# Patient Record
(demographics unavailable — no encounter records)

---

## 2024-11-26 NOTE — HISTORY OF PRESENT ILLNESS
[Sneakers] : rosie [FreeTextEntry1] : Mr. CAROLANN ATKINSON is a 63-year-old male who presents to office for follow up (last seen (9/12/24) due to left feel pain around 3 months ago after stepping on electric plug on the floor. Patient states pain has improved since last visit; however, he is still feeling sharp pain in the heel area. Patient states he numbness/tingling on left foot after walking his dog and at night. Patient denies any new injuries.  Patient states he bought new sneakers and the gel heel cup which has improved the symptoms. He states he is compliant with calf stretching exercises and HEP.

## 2024-11-26 NOTE — DISCUSSION/SUMMARY
[de-identified] : Options reviewed and patient requesting steroid injection L HPS (risks, benefits reviewed in advance).  Under sterile conditions injection 1 cc Depo Medrol / Lidocaine 1% to plantar fascia origin L hindfoot, tolerated well by patient and Band-Aid applied.  NB shoe wear, gel heel cup, activity modification, calf stretching exercises and HEP.  Return to office in 2 - 3 months / PRN, all questions answered.

## 2024-11-26 NOTE — PHYSICAL EXAM
[Normal] : Oriented to person, place, and time, insight and judgement were intact and the affect was normal [de-identified] : Radiographs (3v L foot) reveal plantar and posterior calcaneal enthesophyte L hindfoot, accessory navicular L midfoot. [de-identified] : Extremity: +equinus (releases) L LE, mild residual tenderness PF origin L hindfoot.  Nontender L ankle, peroneals, syndesmosis, Achilles, hindfoot ST, midfoot LF and PTT insertional, and forefoot.  Calves soft and nontender, sensorimotor unchanged, skin intact L LE.  AOx3, mood / affect normal. [de-identified] : KARON, NAD

## 2024-11-26 NOTE — DISCUSSION/SUMMARY
[de-identified] : Options reviewed and patient requesting steroid injection L HPS (risks, benefits reviewed in advance).  Under sterile conditions injection 1 cc Depo Medrol / Lidocaine 1% to plantar fascia origin L hindfoot, tolerated well by patient and Band-Aid applied.  NB shoe wear, gel heel cup, activity modification, calf stretching exercises and HEP.  Return to office in 2 - 3 months / PRN, all questions answered.

## 2024-11-26 NOTE — PHYSICAL EXAM
[Normal] : Oriented to person, place, and time, insight and judgement were intact and the affect was normal [de-identified] : Radiographs (3v L foot) reveal plantar and posterior calcaneal enthesophyte L hindfoot, accessory navicular L midfoot. [de-identified] : Extremity: +equinus (releases) L LE, mild residual tenderness PF origin L hindfoot.  Nontender L ankle, peroneals, syndesmosis, Achilles, hindfoot ST, midfoot LF and PTT insertional, and forefoot.  Calves soft and nontender, sensorimotor unchanged, skin intact L LE.  AOx3, mood / affect normal. [de-identified] : KARON, NAD

## 2025-03-28 NOTE — HISTORY OF PRESENT ILLNESS
[de-identified] : This is a very nice  64 year old  male experiencing  pain in the left hip and left knee.  The pain is located posterior, in the left buttocks and radiates down the back of the left leg to the knee. He gets cramping in the left legs to the feet. Jolts of electric pain to the LLE to the foot.  He denies groin pain.  He denies any accident or injury..

## 2025-03-28 NOTE — PHYSICAL EXAM
[de-identified] : Patient is well nourished, well-developed, in no acute distress, with appropriate mood and affect. The patient is oriented to time, place, and person. Respirations are even and unlabored. Gait evaluation does not reveal a limp. There is no inguinal adenopathy. The bilateral limbra are well-perfused and showed 2+ dp/pt pulses, without skin lesions, shows a grossly normal motor and sensory examination. Examination of the hip shows no skin lesions. Bilateral hip motion is full and painless throughout ROM. Leg lengths are approximately equal. FADIR is negative and LEONA is negative. Stinchfield test is negative. Both hips are stable and muscle strength is normal with good strength with resisted abduction and adduction. Pedal pulses are palpable.  The left knee moves from 0-135 degrees. The knee is stable within that range-of-motion to AP and ML stress. The alignment of the knee is 5 deg varus. Muscle strength is normal. Pedal pulses are palpable. Hip examination was negative.  [de-identified] : AP and lateral x-rays of both hips, pelvis, and femur were ordered and taken in the office and does not show any arthritic changes to the joint. There is satisfactory joint space.   Long standing knee, AP knee, lateral knee, and patellar views of the left knee were ordered and taken in the office and demonstrate no evidence of degenerative joint disease of the knee with joint space maintained.

## 2025-03-28 NOTE — DISCUSSION/SUMMARY
[de-identified] : The patient has left lower extremity radiculopathy. The pain is extraarticular from the hip and knee. An extensive discussion was conducted on the natural history of the disease and the variety of surgical and non-surgical options available to the patient including, but not limited to non-steroidal anti-inflammatory medications, steroid injections, physical therapy, maintenance of ideal body weight, and reduction of activity. Mobic recommended but he prefers OTC NSAIDs prn pain. PT rx. PMR rx. The patient will schedule an appointment as needed.